# Patient Record
Sex: MALE | Race: OTHER | URBAN - METROPOLITAN AREA
[De-identification: names, ages, dates, MRNs, and addresses within clinical notes are randomized per-mention and may not be internally consistent; named-entity substitution may affect disease eponyms.]

---

## 2020-03-11 RX ORDER — ATROPINE SULFATE 10 MG/ML
1 SOLUTION/ DROPS OPHTHALMIC
Status: CANCELLED | OUTPATIENT
Start: 2020-03-11 | End: 2020-03-11

## 2020-03-11 RX ORDER — PHENYLEPHRINE HYDROCHLORIDE 25 MG/ML
1 SOLUTION/ DROPS OPHTHALMIC
Status: CANCELLED | OUTPATIENT
Start: 2020-03-11 | End: 2020-03-11

## 2020-03-11 RX ORDER — TROPICAMIDE 10 MG/ML
1 SOLUTION/ DROPS OPHTHALMIC
Status: CANCELLED | OUTPATIENT
Start: 2020-03-11 | End: 2020-03-11

## 2020-03-17 ENCOUNTER — HOSPITAL ENCOUNTER (OUTPATIENT)
Dept: SURGERY | Age: 65
Discharge: HOME | End: 2020-03-17

## 2020-03-18 VITALS — BODY MASS INDEX: 25.06 KG/M2 | WEIGHT: 185 LBS | HEIGHT: 72 IN

## 2020-03-18 RX ORDER — VALSARTAN AND HYDROCHLOROTHIAZIDE 160; 12.5 MG/1; MG/1
1 TABLET, FILM COATED ORAL DAILY
COMMUNITY

## 2020-03-18 RX ORDER — FENOFIBRATE 150 MG/1
CAPSULE ORAL
COMMUNITY

## 2020-03-18 RX ORDER — AMLODIPINE BESYLATE 10 MG/1
TABLET ORAL DAILY
COMMUNITY

## 2020-03-18 RX ORDER — ACETAMINOPHEN 500 MG
TABLET ORAL
COMMUNITY

## 2020-03-18 RX ORDER — MONTELUKAST SODIUM 10 MG/1
10 TABLET ORAL DAILY
COMMUNITY

## 2020-03-18 RX ORDER — PRAVASTATIN SODIUM 40 MG/1
40 TABLET ORAL
COMMUNITY

## 2020-03-18 RX ORDER — CARVEDILOL 6.25 MG/1
TABLET ORAL 2 TIMES DAILY WITH MEALS
COMMUNITY

## 2020-03-18 NOTE — PERIOP NOTES
Patient verified name&  . Order to obtain consent  found in EHR &  matches case posting. Pt verbalizes understanding of 1 total caregiver/ / visitor policy. Type 1B surgery,  assessment complete. Orders  received. Labs per surgeon: none  Labs per anesthesia protocol: poc potassium signed and held dos. Patient answered medical/surgical history questions at their best of ability. All prior to admission medications reviewed with patient and documented in [address] [facility]. Patient instructed to take ONLY THE FOLLOWING MEDICATIONS ON THE DAY OF SURGERY according to anesthesia guidelines with sips of water:tylenol as instructed, amlodipine  . Pt verbalizes understanding to HOLD THE FOLLOWING MEDICATIONS: none    PT VERBALIZES UNDERSTANDING TO HOLD ALL VITAMINS AND SUPPLEMENTS 7 DAYS PRIOR TO SURGERY DATE (with exception to Renal Vitamins) and NSAIDS 5 DAYS PRIOR TO SURGERY DATE PER ANESTHESIA PROTOCOL. Patient instructed on the following:  Arrive at [facility], time of arrival to be called the day before by 1700. NPO after midnight including gum, mints, tobacco and ice chips. Responsible adult must drive patient to the hospital, stay during surgery, and patient requires supervision 24 hours after anesthesia  Use non-moisturizing soap in shower the night before surgery and on the morning of surgery. Leave all valuables (money and jewelry) at home but bring insurance card and ID on DOS. Do not wear make-up, nail polish, lotions, cologne, perfumes, powders, or oil on skin. Patient teach back successful and patient demonstrates knowledge of instruction. If you do not receive a call with your arrival time by 5pm the business day prior to surgery, please call [phone].

## 2022-12-01 ENCOUNTER — OFFICE VISIT (OUTPATIENT)
Dept: ENT CLINIC | Age: 67
End: 2022-12-01
Payer: COMMERCIAL

## 2022-12-01 VITALS
HEIGHT: 72 IN | BODY MASS INDEX: 26.41 KG/M2 | DIASTOLIC BLOOD PRESSURE: 86 MMHG | WEIGHT: 195 LBS | SYSTOLIC BLOOD PRESSURE: 138 MMHG

## 2022-12-01 DIAGNOSIS — J34.89 NASAL SEPTAL SPUR: ICD-10-CM

## 2022-12-01 DIAGNOSIS — J32.9 RHINOSINUSITIS: Primary | ICD-10-CM

## 2022-12-01 DIAGNOSIS — J31.0 RHINOSINUSITIS: Primary | ICD-10-CM

## 2022-12-01 DIAGNOSIS — J34.3 NASAL TURBINATE HYPERTROPHY: ICD-10-CM

## 2022-12-01 PROCEDURE — 99204 OFFICE O/P NEW MOD 45 MIN: CPT | Performed by: OTOLARYNGOLOGY

## 2022-12-01 PROCEDURE — 31231 NASAL ENDOSCOPY DX: CPT | Performed by: OTOLARYNGOLOGY

## 2022-12-01 RX ORDER — FENOFIBRATE 145 MG/1
1 TABLET, COATED ORAL DAILY
COMMUNITY

## 2022-12-01 RX ORDER — TAMSULOSIN HYDROCHLORIDE 0.4 MG/1
0.4 CAPSULE ORAL DAILY
COMMUNITY
Start: 2022-08-30

## 2022-12-01 RX ORDER — AMLODIPINE BESYLATE 10 MG/1
10 TABLET ORAL DAILY
COMMUNITY

## 2022-12-01 RX ORDER — MONTELUKAST SODIUM 10 MG/1
10 TABLET ORAL DAILY
COMMUNITY

## 2022-12-01 RX ORDER — AZELASTINE 1 MG/ML
2 SPRAY, METERED NASAL 2 TIMES DAILY
Qty: 120 ML | Refills: 1 | Status: SHIPPED | OUTPATIENT
Start: 2022-12-01

## 2022-12-01 RX ORDER — PREDNISONE 10 MG/1
TABLET ORAL
Qty: 30 TABLET | Refills: 0 | Status: SHIPPED | OUTPATIENT
Start: 2022-12-01 | End: 2022-12-16

## 2022-12-01 RX ORDER — AMLODIPINE BESYLATE 10 MG/1
10 TABLET ORAL DAILY
COMMUNITY
End: 2022-12-01 | Stop reason: SDUPTHER

## 2022-12-01 RX ORDER — VALSARTAN AND HYDROCHLOROTHIAZIDE 160; 12.5 MG/1; MG/1
1 TABLET, FILM COATED ORAL DAILY
COMMUNITY

## 2022-12-01 RX ORDER — PRAVASTATIN SODIUM 40 MG
40 TABLET ORAL DAILY
COMMUNITY

## 2022-12-01 RX ORDER — ERGOCALCIFEROL (VITAMIN D2) 1250 MCG
1 CAPSULE ORAL DAILY
COMMUNITY
Start: 2021-02-26

## 2022-12-01 RX ORDER — CARVEDILOL 6.25 MG/1
6.25 TABLET ORAL 2 TIMES DAILY
COMMUNITY

## 2022-12-01 RX ORDER — TAMOXIFEN CITRATE 20 MG/1
20 TABLET ORAL DAILY
COMMUNITY
Start: 2022-07-20

## 2022-12-01 RX ORDER — FLUTICASONE PROPIONATE 50 MCG
2 SPRAY, SUSPENSION (ML) NASAL 2 TIMES DAILY
COMMUNITY

## 2022-12-01 RX ORDER — TADALAFIL 10 MG/1
10 TABLET ORAL PRN
COMMUNITY
Start: 2021-05-27

## 2022-12-01 RX ORDER — CARVEDILOL 6.25 MG/1
6.25 TABLET ORAL DAILY
COMMUNITY
End: 2022-12-01 | Stop reason: SDUPTHER

## 2022-12-01 RX ORDER — CETIRIZINE HYDROCHLORIDE 10 MG/1
1 TABLET ORAL DAILY
COMMUNITY

## 2022-12-01 NOTE — PROGRESS NOTES
Daina Gipson MD [address], [address] [place] [address]  P: [phone]        12/1/2022      HPI:  Catina Lorenzo is a 69 y.o. male seen in consultation today for    Chief Complaint   Patient presents with    Sinus Problem       Onset of sinus symptoms: couple months  Recurrent infections: yes  Number of antibiotics prescribed for sinusitis per year: on third antibiotic  Longest course of antibiotics taken: 10 day course  Currently taking or recent history of nasal steroid use: yes  Difficulty breathing through the nose and worse time of day: yes, worse at night   Sinus pressure and pain present and where: denies  Post-nasal drainage present and quality of mucous: clear, occasional thick mucoid   Cough: yes  History of nosebleeds:no  Fatigue present: yes  History of allergy testing: yes, currently receiving immunotherapy  Previous CT scan performed: no  Previous sinus surgery: no      Current Outpatient Medications:     amLODIPine (NORVASC) 10 MG tablet, Take 10 mg by mouth daily, Disp: , Rfl:     valsartan-hydroCHLOROthiazide (DIOVAN-HCT) 160-12.5 MG per tablet, Take 1 tablet by mouth daily, Disp: , Rfl:     tamsulosin (FLOMAX) 0.4 MG capsule, Take 0.4 mg by mouth daily, Disp: , Rfl:     carvedilol (COREG) 6.25 MG tablet, Take 6.25 mg by mouth 2 times daily, Disp: , Rfl:     fluticasone (FLONASE) 50 MCG/ACT nasal spray, 2 sprays by Each Nostril route in the morning and at bedtime, Disp: , Rfl:     fenofibrate (TRICOR) 145 MG tablet, Take 1 tablet by mouth daily, Disp: , Rfl:     cetirizine (ZYRTEC) 10 MG tablet, Take 1 tablet by mouth daily, Disp: , Rfl:     pravastatin (PRAVACHOL) 40 MG tablet, Take 40 mg by mouth daily, Disp: , Rfl:     tamoxifen (NOLVADEX) 20 MG tablet, Take 20 mg by mouth daily, Disp: , Rfl:     tadalafil (CIALIS) 10 MG tablet, Take 10 mg by mouth as needed, Disp: , Rfl:     dapagliflozin (FARXIGA) 10 MG tablet, Take 10 mg by mouth daily, Disp: , Rfl:     ergocalciferol (ERGOCALCIFEROL) 1.25 MG (87700 UT) capsule, Take 1 capsule by mouth daily, Disp: , Rfl:     azelastine (ASTELIN) 0.1 % nasal spray, 2 sprays by Nasal route 2 times daily Use in each nostril as directed, Disp: 120 mL, Rfl: 1    predniSONE (DELTASONE) 10 MG tablet, Take 3 tablets by mouth daily for 5 days, THEN 2 tablets daily for 5 days, THEN 1 tablet daily for 5 days. , Disp: 30 tablet, Rfl: 0    montelukast (SINGULAIR) 10 MG tablet, Take 10 mg by mouth daily, Disp: , Rfl:     Past Medical History:   Diagnosis Date    Hypertension        Past Surgical History:   Procedure Laterality Date    CATARACT REMOVAL Left     IOl        Family History   Problem Relation Age of Onset    Diabetes Brother     No Known Problems Sister     No Known Problems Sister         Past Surgical History:   Procedure Laterality Date    CATARACT REMOVAL Left     IOl        No Known Allergies     ROS:  The patient was asked specifically about the following. General: Fever, chills, night sweats, unexplained weight loss or weight gain  Eyes: Blurry vision, double vision, floaters, loss or decrease of peripheral vision.    Ears: Difficulty hearing, ringing or buzzing in the ears, ear fullness, frequent ear infections, ear pain or drainage, hearing aid  Nose/Face: Drainage, frequent nosebleeds, sinus pain, pressure or fullness, difficulty breathing through the nose, facial pain, swelling or masses  Mouth: Sores in mouth, tongue soreness, bleeding gums, wears dentures, growths in mouth  Throat: Sore throat, hoarseness, difficulty swallowing, lump in throat, sore throat frequency  Respiratory: Difficulty breathing, frequent cough, productive cough, wheezing, recent abnormal chest X-RAY  Cardiovascular: Blocked arteries, chest pain, shortness of breath, abnormal heart beat, pacemaker  Digestive: Frequent indigestion, burning in throat,chest or stomach after a meal, burning that wakes you in the night, abdominal pain  Neuropsychiatric: Headaches, Otoscopy of the external auditory and tympanic membranes was performed to assess for patency, induration, erythema, tympanic membrane health and mobility and the presence of any middle ear fluid or abnormality. Speech reception thresholds were grossly assessed through communication at normal conversational levels. NOSE:   External exam for gross deformity of the nasal bones and upper and lower lateral cartilages was performed. Anterior rhinoscopy was performed to assess the patency of the nasal airway, the anatomy of the nasal septum and turbinates as well as the nasal valve region, and the general mucosal health. The presence of any rhinorrhea and its consistency was noted. Any abnormalities requiring further evaluation by nasal endoscopy will be described below. MOUTH/PHARYNX/LARYNX:   Assessment of the lips, gums, hard/soft palate, tongue, tonsillar fossae and oropharynx for mass, lesions or mucosal abnormalities was performed. The base of tongue and floor of mouth were inspected for lesions and palpated for mass or nodularity. Mirror exam of the larynx to assess for vocal fold mobility and any gross mass or lesion was performed. Mirror exam of the nasopharynx was attempted, to assess for gross mass or lesion of the nasopharynx or any of adenoidal hyperplasia or inflammation. Any abnormalities requiring further examination by flexible endoscopy will be described below. NECK:   Gross inspection of the neck was performed to assess for mass or asymmetry. Palpation of the level I-IV lymph nodes was performed to assess for any grossly enlarged, or abnormally firm lymphadenopathy. The skin of the neck was examined for any induration or swelling and palpated for any crepitus. The larynx and trachea were palpated to assess position in the neck and continuity. The thyroid was palpated to assess for any mass, nodularity or asymmetry.      NEURO/PSYCH:   Cranial nerves II-XII were grossly assessed for any weakness or asymmetry. If indicated, CN I was assessed by administration of a standardized smell test (UPSIT). Orientation to person, place and time was assessed. Mood and affect were assessed. RESPIRATION:   Respiratory effort was assessed for increased work of breathing and inspiratory or expiratory wheezing. Chest expansion was noted for symmetry. CARDIOVASCULAR:   Gross examination for peripheral vascular edema and jugular venous distension was performed. PERTINENT PHYSICAL EXAM FINDINGS - examination for above was grossly within normal limits with exceptions listed below: On anterior rhinoscopy, nasal airway is significant for anterior septal spur and severe bilateral turbinate hypertrophy. Moderate response to decongestant therapy. Due to limitations of anterior anoscopy, nasal endoscopy was performed. No evidence of active sinus exudates or nasal polyps. The right ostiomeatal complex is widely patent and the maxillary sinus mucosa was able to be inspected and was healthy in appearance. On the left anatomical restrictions prevented navigation to the [facility] however I did not see any evidence for polyp disease or exudates. The nasopharynx was clear. There is some evidence of cobblestoning of the posterior pharyngeal mucosa. Remainder of the laryngeal exam within normal limits    Studies Reviewed  Referral documentation      PROCEDURES:  Informed consent was obtained. The bilateral sinonasal passages were topicalized with afrin and ponticaine sprays. The endoscope was used to evaluate each nasal passage, middle meatus, frontal infindibulum, sphenoethmoid recess and choanae. The patient tolerated the procedure well without complication. Findings are documented above. ASSESSMENT AND PLAN:      Diagnosis Orders   1. Rhinosinusitis  azelastine (ASTELIN) 0.1 % nasal spray    predniSONE (DELTASONE) 10 MG tablet      2. Nasal septal spur        3.  Nasal turbinate hypertrophy             Discussed diagnosis of Rhinosinusitis in detail at today's visit. Recommend daily nasal saline washes. New Rx for: azelastine and Prednisone taper x 15 days. RTC in 3 weeks for serial evaluation. The patient diagnoses and management plan were discussed at length. They  demonstrated an understanding of the plan and stated that all questions were answered to their satisfaction. Please CC referring provider, thank you.        PATIENT EDUCATION / INSTRUCTIONS GIVEN FOR:  Nasal Hygiene

## 2023-01-05 ENCOUNTER — OFFICE VISIT (OUTPATIENT)
Dept: ENT CLINIC | Age: 68
End: 2023-01-05
Payer: COMMERCIAL

## 2023-01-05 ENCOUNTER — TELEPHONE (OUTPATIENT)
Dept: ENT CLINIC | Age: 68
End: 2023-01-05

## 2023-01-05 VITALS — HEIGHT: 72 IN | BODY MASS INDEX: 26.41 KG/M2 | WEIGHT: 195 LBS

## 2023-01-05 DIAGNOSIS — J34.2 NASAL SEPTAL DEVIATION: ICD-10-CM

## 2023-01-05 DIAGNOSIS — J34.89 REFRACTORY OBSTRUCTION OF NASAL AIRWAY: Primary | ICD-10-CM

## 2023-01-05 DIAGNOSIS — J34.3 HYPERTROPHY OF INFERIOR NASAL TURBINATE: ICD-10-CM

## 2023-01-05 PROCEDURE — 99214 OFFICE O/P EST MOD 30 MIN: CPT | Performed by: OTOLARYNGOLOGY

## 2023-01-05 PROCEDURE — 1123F ACP DISCUSS/DSCN MKR DOCD: CPT | Performed by: OTOLARYNGOLOGY

## 2023-01-05 NOTE — PROGRESS NOTES
Leroy Gutirerez  [facility], [address] [place] [address]  P: [phone]          1/5/2023    Chief Complaint   Patient presents with    Sinus Problem     Follow up          HPI:  Mayank Garcia is a 69year old male patient returning to clinic for sinusitis. He has completed a 15 day course steroid taper, azelastine nasal spray daily, and daily saline rinses. He reports improvement in his symptoms, although he still reports daytime congestion and nasal airway obstruction at night. Current Outpatient Medications   Medication Sig Dispense Refill    amLODIPine (NORVASC) 10 MG tablet Take 10 mg by mouth daily      valsartan-hydroCHLOROthiazide (DIOVAN-HCT) 160-12.5 MG per tablet Take 1 tablet by mouth daily      tamsulosin (FLOMAX) 0.4 MG capsule Take 0.4 mg by mouth daily      carvedilol (COREG) 6.25 MG tablet Take 6.25 mg by mouth 2 times daily      fluticasone (FLONASE) 50 MCG/ACT nasal spray 2 sprays by Each Nostril route in the morning and at bedtime      fenofibrate (TRICOR) 145 MG tablet Take 1 tablet by mouth daily      cetirizine (ZYRTEC) 10 MG tablet Take 1 tablet by mouth daily      montelukast (SINGULAIR) 10 MG tablet Take 10 mg by mouth daily      pravastatin (PRAVACHOL) 40 MG tablet Take 40 mg by mouth daily      tamoxifen (NOLVADEX) 20 MG tablet Take 20 mg by mouth daily      tadalafil (CIALIS) 10 MG tablet Take 10 mg by mouth as needed      dapagliflozin (FARXIGA) 10 MG tablet Take 10 mg by mouth daily      ergocalciferol (ERGOCALCIFEROL) 1.25 MG (21505 UT) capsule Take 1 capsule by mouth daily      azelastine (ASTELIN) 0.1 % nasal spray 2 sprays by Nasal route 2 times daily Use in each nostril as directed 120 mL 1     No current facility-administered medications for this visit.         Past Medical History:   Diagnosis Date    Hypertension         Past Surgical History:   Procedure Laterality Date    CATARACT REMOVAL Left     IOl        Family History   Problem Relation Age of Onset    Diabetes Brother     No Known Problems Sister     No Known Problems Sister         Past Surgical History:   Procedure Laterality Date    CATARACT REMOVAL Left     IOl        No Known Allergies       ROS:  As noted per HPI. PHYSICAL EXAM:    Vitals: There were no vitals filed for this visit. GENERAL:   PHYSICAL EXAM: A comprehensive physical exam was performed in the following manner. Unless otherwise indicated in pertinent findings section below, findings were within normal limits. APPEARANCE:   General assessment for development status, nutritional status, and for pain or distress was performed. COMMUNICATION:   Ability to communicate effectively including vocal quality was assessed. HEAD AND FACE:   General exam of the face and scalp for any gross masses or lesions was performed. Palpation of the sinuses for any sign of pain or tenderness was performed. Facial nerve examination for any facial mimetic muscle asymmetry at rest and with effort was performed. Palpation of the submandibular and parotid glands was performed to assess for asymmetry, nodule or masses. EYES:   Extraocular motility was assessed for medial, lateral, superior and inferior rectus function as well as inferior and superior oblique function. The conjunctiva and eyelids were examined for injection, pallor or swelling. Pupil reactivity and accomodation was assessed. EARS:   External inspection and palpation of the auricular skin and cartilage was performed for lesion or abnormality. Otoscopy of the external auditory and tympanic membranes was performed to assess for patency, induration, erythema, tympanic membrane health and mobility and the presence of any middle ear fluid or abnormality. Speech reception thresholds were grossly assessed through communication at normal conversational levels.       NOSE:   External exam for gross deformity of the nasal bones and upper and lower lateral cartilages was performed. Anterior rhinoscopy was performed to assess the patency of the nasal airway, the anatomy of the nasal septum and turbinates as well as the nasal valve region, and the general mucosal health. The presence of any rhinorrhea and its consistency was noted. Any abnormalities requiring further evaluation by nasal endoscopy will be described below. MOUTH/PHARYNX/LARYNX:   Assessment of the lips, gums, hard/soft palate, tongue, tonsillar fossae and oropharynx for mass, lesions or mucosal abnormalities was performed. The base of tongue and floor of mouth were inspected for lesions and palpated for mass or nodularity. Mirror exam of the larynx to assess for vocal fold mobility and any gross mass or lesion was performed. Mirror exam of the nasopharynx was attempted, to assess for gross mass or lesion of the nasopharynx or any of adenoidal hyperplasia or inflammation. Any abnormalities requiring further examination by flexible endoscopy will be described below. NECK:   Gross inspection of the neck was performed to assess for mass or asymmetry. Palpation of the level I-IV lymph nodes was performed to assess for any grossly enlarged, or abnormally firm lymphadenopathy. The skin of the neck was examined for any induration or swelling and palpated for any crepitus. The larynx and trachea were palpated to assess position in the neck and continuity. The thyroid was palpated to assess for any mass, nodularity or asymmetry. NEURO/PSYCH:   Cranial nerves II-XII were grossly assessed for any weakness or asymmetry. If indicated, CN I was assessed by administration of a standardized smell test (UPSIT). Orientation to person, place and time was assessed. Mood and affect were assessed. RESPIRATION:   Respiratory effort was assessed for increased work of breathing and inspiratory or expiratory wheezing. Chest expansion was noted for symmetry.       CARDIOVASCULAR:   Noreen Sanches examination for peripheral vascular edema and jugular venous distension was performed. PERTINENT PHYSICAL EXAM FINDINGS   Persistence of extensive mucosal edema within the nasal cavities. Turbinates remain severely hypertrophic. Complex septal deviation with contact with the turbinate and spur is noted. STUDIES REVIEWED:   Chart Review      ASSESSMENT AND PLAN:      Diagnosis Orders   1. Refractory obstruction of nasal airway        2. Nasal septal deviation        3. Hypertrophy of inferior nasal turbinate                Discussed risk benefits and alternatives to surgical therapy for refractory nasal airway obstruction including septoplasty and submucous resection of inferior nasal turbinates. Patient demonstrated good understanding and desires to proceed with surgery time. Please fax accordingly. The patient diagnoses and management plan were discussed at length. They  demonstrated and understanding of the plan and stated that all questions were answered to their satisfaction.      PATIENT EDUCATION / INSTRUCTIONS GIVEN FOR:   SMR/Septoplasty

## 2023-01-05 NOTE — TELEPHONE ENCOUNTER
I called pt to schedule surgery . 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  Thank you

## 2023-01-25 ENCOUNTER — TELEPHONE (OUTPATIENT)
Dept: ENT CLINIC | Age: 68
End: 2023-01-25

## 2023-01-25 NOTE — TELEPHONE ENCOUNTER
Patient is checking with work to see if 2/27 at [facility] will work. He is also checking with the insurance company to see about pricing. He will call back to schedule.

## 2023-02-09 ENCOUNTER — TELEPHONE (OUTPATIENT)
Dept: ENT CLINIC | Age: 68
End: 2023-02-09

## 2023-02-09 NOTE — TELEPHONE ENCOUNTER
Spoke with patient he went to his primary care doctor and got some abx for his nasal congestion. He expressed his concerns that he would not be able to do surgery due to the severe congestion. I advised patient that even with the congestion surgery would still happen. Pt voiced understanding and stated he will call back if he needed anything further.

## 2023-02-28 ENCOUNTER — TELEPHONE (OUTPATIENT)
Dept: ENT CLINIC | Age: 68
End: 2023-02-28

## 2023-03-06 ENCOUNTER — OFFICE VISIT (OUTPATIENT)
Dept: ENT CLINIC | Age: 68
End: 2023-03-06

## 2023-03-06 VITALS — OXYGEN SATURATION: 99 % | WEIGHT: 192 LBS | RESPIRATION RATE: 22 BRPM | HEIGHT: 71 IN | BODY MASS INDEX: 26.88 KG/M2

## 2023-03-06 DIAGNOSIS — J34.3 HYPERTROPHY OF INFERIOR NASAL TURBINATE: ICD-10-CM

## 2023-03-06 DIAGNOSIS — J34.89 NASAL SEPTAL SPUR: ICD-10-CM

## 2023-03-06 DIAGNOSIS — J34.3 NASAL TURBINATE HYPERTROPHY: ICD-10-CM

## 2023-03-06 DIAGNOSIS — G89.18 POST-OP PAIN: ICD-10-CM

## 2023-03-06 DIAGNOSIS — J34.2 NASAL SEPTAL DEVIATION: Primary | ICD-10-CM

## 2023-03-06 PROCEDURE — 99024 POSTOP FOLLOW-UP VISIT: CPT | Performed by: PHYSICIAN ASSISTANT

## 2023-03-06 ASSESSMENT — ENCOUNTER SYMPTOMS
EYE DISCHARGE: 0
ABDOMINAL PAIN: 0
CHOKING: 0
COUGH: 0

## 2023-03-06 NOTE — PROGRESS NOTES
Zandra Duran (1955) presents today s/p Septo, SMR with Dr. Glenn Robertson 2/27/23. He completed his course of abx and has been doing well with only one day of bloody drainage which has since resolved. He is congested but otherwise doing well. Chief Complaint   Patient presents with    Post-Op Check     Patient here for his post-op visit. There is no problem list on file for this patient. Reviewed and updated this visit by provider:  Tobacco  Allergies  Meds  Problems  Med Hx  Surg Hx  Fam Hx         Review of Systems   Constitutional:  Negative for fever. HENT:  Negative for ear discharge and ear pain. Eyes:  Negative for discharge. Respiratory:  Negative for cough and choking. Cardiovascular:  Negative for chest pain. Gastrointestinal:  Negative for abdominal pain. Genitourinary:  Negative for difficulty urinating. Musculoskeletal:  Negative for neck pain. Skin:  Negative for rash. Allergic/Immunologic: Negative for environmental allergies. Neurological:  Negative for dizziness. Hematological:  Negative for adenopathy. Psychiatric/Behavioral:  Negative for agitation. Resp 22   Ht 5' 11\" (1.803 m)   Wt 192 lb (87.1 kg)   SpO2 99%   BMI 26.78 kg/m²     ENT Physical Exam  Constitutional  Appearance: patient appears well-developed, well-nourished and well-groomed, Appearance comments: pt smells of cigarettes  Nose  External Nose: wide nose; Internal Nose: bilateral intranasal mucosa bleeding noted; nasal septal deviation present; nasal septal crusting noted; Septum comments: healing well. intranasal septal splints were removed atraumatically. bilateral inferior turbinates with mucus and post-surgical changes; Inferior Turbinates comments: some green drainage from the left nare. Oral Cavity/Oropharynx  Lips: normal;  Teeth: normal;  Gums: gingiva normal;  Tongue: normal;  Oral mucosa: normal;  Soft palate: normal;  Tonsils: normal;    Assessment/Plan:  1. Nasal septal deviation  2. Hypertrophy of inferior nasal turbinate  3. Nasal turbinate hypertrophy  4. Nasal septal spur  5. Post-op pain     Pt tolerated intranasal splint removal well. Will continue daily sinus rinses. Return in about 4 weeks (around 4/3/2023).     DORA Sullivan

## 2023-04-07 ENCOUNTER — OFFICE VISIT (OUTPATIENT)
Dept: ENT CLINIC | Age: 68
End: 2023-04-07
Payer: COMMERCIAL

## 2023-04-07 VITALS — WEIGHT: 192 LBS | HEIGHT: 71 IN | BODY MASS INDEX: 26.88 KG/M2 | RESPIRATION RATE: 16 BRPM

## 2023-04-07 DIAGNOSIS — F17.210 TOBACCO DEPENDENCE DUE TO CIGARETTES: ICD-10-CM

## 2023-04-07 DIAGNOSIS — J34.89 NASAL CRUSTING: Primary | ICD-10-CM

## 2023-04-07 PROCEDURE — 1123F ACP DISCUSS/DSCN MKR DOCD: CPT | Performed by: OTOLARYNGOLOGY

## 2023-04-07 PROCEDURE — 99212 OFFICE O/P EST SF 10 MIN: CPT | Performed by: OTOLARYNGOLOGY

## 2023-04-07 NOTE — PROGRESS NOTES
flexible endoscopy will be described below. RESPIRATION:   Respiratory effort was assessed for increased work of breathing and inspiratory or expiratory wheezing. Chest expansion was noted for symmetry. CARDIOVASCULAR:   Gross examination for peripheral vascular edema and jugular venous distension was performed. PERTINENT PHYSICAL EXAM FINDINGS   Mild crusting of the left caudal nasal septum. Nasal airway widely patent bilaterally. STUDIES REVIEWED:   Chart review        ASSESSMENT AND PLAN:      Diagnosis Orders   1. Nasal crusting        2. Tobacco dependence due to cigarettes              New prescription for mupirocin to left nasal septum for crusting. Discussed importance of smoking cessation. Patient does not seem ready to develop a nicotine replacement plan or cessation plan at this time. He was encouraged to do so. The patient diagnoses and management plan were discussed at length. They  demonstrated and understanding of the plan and stated that all questions were answered to their satisfaction.      PATIENT EDUCATION / INSTRUCTIONS GIVEN FOR:   Nasal hygiene  Smoking cessation

## 2024-04-05 ENCOUNTER — OFFICE VISIT (OUTPATIENT)
Dept: ENT CLINIC | Age: 69
End: 2024-04-05
Payer: COMMERCIAL

## 2024-04-05 VITALS
HEIGHT: 61 IN | DIASTOLIC BLOOD PRESSURE: 80 MMHG | BODY MASS INDEX: 35.12 KG/M2 | SYSTOLIC BLOOD PRESSURE: 142 MMHG | WEIGHT: 186 LBS

## 2024-04-05 DIAGNOSIS — J34.89 NASAL CRUSTING: Primary | ICD-10-CM

## 2024-04-05 DIAGNOSIS — N42.1 CONGESTION AND HEMORRHAGE OF PROSTATE: ICD-10-CM

## 2024-04-05 PROCEDURE — 1123F ACP DISCUSS/DSCN MKR DOCD: CPT | Performed by: OTOLARYNGOLOGY

## 2024-04-05 PROCEDURE — 99213 OFFICE O/P EST LOW 20 MIN: CPT | Performed by: OTOLARYNGOLOGY

## 2024-04-05 RX ORDER — METFORMIN HYDROCHLORIDE 500 MG/1
1 TABLET, EXTENDED RELEASE ORAL DAILY
COMMUNITY

## 2024-04-05 RX ORDER — AMOXICILLIN AND CLAVULANATE POTASSIUM 875; 125 MG/1; MG/1
TABLET, FILM COATED ORAL
COMMUNITY

## 2024-04-05 NOTE — PROGRESS NOTES
Randal Lainez MD [facility]  [address] [place] [id number]  P: [phone]          4/5/2024    Chief Complaint   Patient presents with    Follow-up    Sinus Problem         HPI:  Ousmane is a 68 year old male following on his sinuses for crusting, 1 year. He reports some dry crusting and sore in left nostril sometimes but better. Congestion is improved.  Overall breathing well through the nose.  Continues to use cigarettes daily.      Current Outpatient Medications   Medication Sig Dispense Refill    metFORMIN (GLUCOPHAGE-XR) 500 MG extended release tablet Take 1 tablet by mouth daily      mupirocin (BACTROBAN) 2 % ointment Apply topically 3 times daily. 1 each 0    amLODIPine (NORVASC) 10 MG tablet Take 1 tablet by mouth daily      valsartan-hydroCHLOROthiazide (DIOVAN-HCT) 160-12.5 MG per tablet Take 1 tablet by mouth daily      carvedilol (COREG) 6.25 MG tablet Take 1 tablet by mouth 2 times daily      fenofibrate (TRICOR) 145 MG tablet Take 1 tablet by mouth daily      montelukast (SINGULAIR) 10 MG tablet Take 1 tablet by mouth daily      pravastatin (PRAVACHOL) 40 MG tablet Take 1 tablet by mouth daily      tadalafil (CIALIS) 10 MG tablet Take 1 tablet by mouth as needed      ergocalciferol (ERGOCALCIFEROL) 1.25 MG (35765 UT) capsule Take 1 capsule by mouth daily      amoxicillin-clavulanate (AUGMENTIN) 875-125 MG per tablet amoxicillin 875 mg-potassium clavulanate 125 mg tablet (Patient not taking: Reported on 4/5/2024)      cetirizine (ZYRTEC) 10 MG tablet Take 1 tablet by mouth daily (Patient not taking: Reported on 4/5/2024)      tamoxifen (NOLVADEX) 20 MG tablet Take 20 mg by mouth daily (Patient not taking: Reported on 4/5/2024)      azelastine (ASTELIN) 0.1 % nasal spray 2 sprays by Nasal route 2 times daily Use in each nostril as directed (Patient not taking: Reported on 4/5/2024) 120 mL 1     No current facility-administered medications for this visit.        Past Medical History:

## 2025-05-02 ENCOUNTER — OFFICE VISIT (OUTPATIENT)
Dept: ENT CLINIC | Age: 70
End: 2025-05-02

## 2025-05-02 VITALS
HEIGHT: 71 IN | SYSTOLIC BLOOD PRESSURE: 140 MMHG | DIASTOLIC BLOOD PRESSURE: 82 MMHG | BODY MASS INDEX: 25.34 KG/M2 | WEIGHT: 181 LBS

## 2025-05-02 DIAGNOSIS — L71.1 RHINOPHYMA: ICD-10-CM

## 2025-05-02 DIAGNOSIS — R09.81 NASAL CONGESTION: Primary | ICD-10-CM

## 2025-05-02 DIAGNOSIS — J32.9 RHINOSINUSITIS: ICD-10-CM

## 2025-05-02 NOTE — PROGRESS NOTES
[place] [address]  P: [phone]          5/2/2025    Chief Complaint   Patient presents with    Follow-up    Sinus Problem         HPI:  History of Present Illness            History of Present Illness  The patient is a 69-year-old male who presents for follow-up of his nose.    He reports good sinus health but experiences congestion, which he manages with Ryaltris. He also has rhinophyma, contributing to his sense of congestion. He reports experiencing severe allergies, which are particularly bothersome during the spring season. His nasal breathing is generally unimpeded during the day; however, he experiences dryness in his nose when lying down at night. He continues to smoke cigarettes and has been making efforts to quit. He has been using a saline mist as part of his treatment regimen.    SOCIAL HISTORY  The patient smokes cigarettes.    MEDICATIONS  Ryaltris          Current Outpatient Medications   Medication Sig Dispense Refill    metFORMIN (GLUCOPHAGE-XR) 500 MG extended release tablet Take 1 tablet by mouth daily      amLODIPine (NORVASC) 10 MG tablet Take 1 tablet by mouth daily      valsartan-hydroCHLOROthiazide (DIOVAN-HCT) 160-12.5 MG per tablet Take 1 tablet by mouth daily      carvedilol (COREG) 6.25 MG tablet Take 1 tablet by mouth 2 times daily      montelukast (SINGULAIR) 10 MG tablet Take 1 tablet by mouth daily      pravastatin (PRAVACHOL) 40 MG tablet Take 1 tablet by mouth daily      Olopatadine-Mometasone 665-25 MCG/ACT SUSP 2 sprays by Nasal route in the morning and at bedtime (Patient not taking: Reported on 5/2/2025) 29 g 5    amoxicillin-clavulanate (AUGMENTIN) 875-125 MG per tablet amoxicillin 875 mg-potassium clavulanate 125 mg tablet (Patient not taking: Reported on 5/2/2025)      mupirocin (BACTROBAN) 2 % ointment Apply topically 3 times daily. (Patient not taking: Reported on 5/2/2025) 1 each 0    fenofibrate (TRICOR) 145